# Patient Record
Sex: FEMALE | Race: WHITE | NOT HISPANIC OR LATINO | URBAN - METROPOLITAN AREA
[De-identification: names, ages, dates, MRNs, and addresses within clinical notes are randomized per-mention and may not be internally consistent; named-entity substitution may affect disease eponyms.]

---

## 2017-01-21 ENCOUNTER — EMERGENCY (EMERGENCY)
Facility: HOSPITAL | Age: 72
LOS: 1 days | Discharge: HOME | End: 2017-01-21
Attending: EMERGENCY MEDICINE | Admitting: EMERGENCY MEDICINE
Payer: MEDICARE

## 2017-01-21 VITALS
HEART RATE: 73 BPM | RESPIRATION RATE: 20 BRPM | TEMPERATURE: 99 F | OXYGEN SATURATION: 99 % | SYSTOLIC BLOOD PRESSURE: 144 MMHG | DIASTOLIC BLOOD PRESSURE: 84 MMHG

## 2017-01-21 DIAGNOSIS — S16.1XXA STRAIN OF MUSCLE, FASCIA AND TENDON AT NECK LEVEL, INITIAL ENCOUNTER: ICD-10-CM
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
## 2017-01-21 PROCEDURE — 99282 EMERGENCY DEPT VISIT SF MDM: CPT

## 2017-01-21 RX ORDER — POLYETHYLENE GLYCOL 3350 17 G/17G
0 POWDER, FOR SOLUTION ORAL
Qty: 0 | Refills: 0 | COMMUNITY

## 2017-01-21 RX ORDER — ALENDRONATE SODIUM 70 MG/1
0 TABLET ORAL
Qty: 0 | Refills: 0 | COMMUNITY

## 2017-01-21 RX ORDER — DIPHENHYDRAMINE HCL 50 MG
0 CAPSULE ORAL
Qty: 0 | Refills: 0 | COMMUNITY

## 2017-01-21 NOTE — ED PROVIDER NOTE - NS ED ATTENDING STATEMENT MOD
Attending Only I have personally performed a face to face diagnostic evaluation on this patient. I have reviewed the NP note and agree with the history, exam, and plan of care, except as noted.

## 2017-01-21 NOTE — ED PROVIDER NOTE - CHPI ED SYMPTOMS NEG
no fever/no confusion/no loss of consciousness/no blurred vision/no numbness/no nausea/no change in level of consciousness/no weakness/no vomiting/no dizziness

## 2017-01-21 NOTE — ED PROVIDER NOTE - MEDICAL DECISION MAKING DETAILS
Neck spasm, occipital, associated after beginning vigorous exercise program this week.  No neuro deficits.  No HA, no vision change, normal gait, no acute trauma.  No concern for subarachnoid hemorrhage or emergent process.  Not on AC.  Well appearing and comfortable.  Palpation reproduced mild discomfort at area in spasm, other wise normal exam.  Recommended heat packs and D/C.  Has f/u c PCP this week.  Return instructions provided.  --BMM

## 2017-01-21 NOTE — ED ADULT NURSE NOTE - OBJECTIVE STATEMENT
72 y/o F pt present to ED for intermittent head pressure to back of head, 5-10 minutes episode times this morning, denies dizziness, visual changes

## 2017-01-21 NOTE — ED ADULT TRIAGE NOTE - CHIEF COMPLAINT QUOTE
Intermittent pressure to back of head. No other symptoms. No vomiting, No visual changes. Feels the pressure in the same spot since this morning. 5-10mins betweek episodes.

## 2017-01-21 NOTE — ED PROVIDER NOTE - OBJECTIVE STATEMENT
72yo female pt, ambulatory, No PMHx, c/o occipital pressure since this am. Pt stated the pressure's lasting less than 1 minutes and intermittent pressure, about 10times today. Denies pain. Denies visual changes or N/V. Denies neck pain/ back pain. Denies sensory changes or weakness to extremities. Denies CP/SOB/ABD pain. Denies injury. Denies fever, chills or recent cold symptoms.

## 2017-01-21 NOTE — ED PROVIDER NOTE - PHYSICAL EXAMINATION
NAD, VSS, Afebrile, No facial or scalp tender, No occipital tender or lesions, No spinal tender, Neuro- intact.

## 2017-03-17 ENCOUNTER — APPOINTMENT (OUTPATIENT)
Dept: GASTROENTEROLOGY | Facility: CLINIC | Age: 72
End: 2017-03-17

## 2017-03-17 VITALS
OXYGEN SATURATION: 99 % | WEIGHT: 148 LBS | BODY MASS INDEX: 25.27 KG/M2 | HEIGHT: 64 IN | SYSTOLIC BLOOD PRESSURE: 152 MMHG | DIASTOLIC BLOOD PRESSURE: 84 MMHG | HEART RATE: 77 BPM

## 2017-03-17 DIAGNOSIS — F15.90 OTHER STIMULANT USE, UNSPECIFIED, UNCOMPLICATED: ICD-10-CM

## 2017-03-17 DIAGNOSIS — Z78.9 OTHER SPECIFIED HEALTH STATUS: ICD-10-CM

## 2017-03-17 DIAGNOSIS — Z80.3 FAMILY HISTORY OF MALIGNANT NEOPLASM OF BREAST: ICD-10-CM

## 2017-03-17 RX ORDER — LORATADINE 10 MG
17 TABLET,DISINTEGRATING ORAL
Refills: 0 | Status: ACTIVE | COMMUNITY

## 2017-04-04 ENCOUNTER — APPOINTMENT (OUTPATIENT)
Dept: GASTROENTEROLOGY | Facility: CLINIC | Age: 72
End: 2017-04-04

## 2017-04-24 ENCOUNTER — OUTPATIENT (OUTPATIENT)
Dept: OUTPATIENT SERVICES | Facility: HOSPITAL | Age: 72
LOS: 1 days | Discharge: HOME | End: 2017-04-24
Payer: MEDICARE

## 2017-04-24 ENCOUNTER — TRANSCRIPTION ENCOUNTER (OUTPATIENT)
Age: 72
End: 2017-04-24

## 2017-04-24 ENCOUNTER — APPOINTMENT (OUTPATIENT)
Dept: GASTROENTEROLOGY | Facility: HOSPITAL | Age: 72
End: 2017-04-24

## 2017-04-24 DIAGNOSIS — Z12.11 ENCOUNTER FOR SCREENING FOR MALIGNANT NEOPLASM OF COLON: ICD-10-CM

## 2017-04-24 PROCEDURE — G0121: CPT

## 2017-04-24 PROCEDURE — 45378 DIAGNOSTIC COLONOSCOPY: CPT

## 2017-04-28 DIAGNOSIS — K64.4 RESIDUAL HEMORRHOIDAL SKIN TAGS: ICD-10-CM

## 2017-04-28 DIAGNOSIS — K64.8 OTHER HEMORRHOIDS: ICD-10-CM

## 2017-04-28 DIAGNOSIS — Z88.2 ALLERGY STATUS TO SULFONAMIDES: ICD-10-CM

## 2017-04-28 DIAGNOSIS — Z12.11 ENCOUNTER FOR SCREENING FOR MALIGNANT NEOPLASM OF COLON: ICD-10-CM

## 2017-04-28 DIAGNOSIS — K57.30 DIVERTICULOSIS OF LARGE INTESTINE WITHOUT PERFORATION OR ABSCESS WITHOUT BLEEDING: ICD-10-CM

## 2017-09-18 ENCOUNTER — EMERGENCY (EMERGENCY)
Facility: HOSPITAL | Age: 72
LOS: 1 days | Discharge: HOME | End: 2017-09-18
Attending: EMERGENCY MEDICINE | Admitting: EMERGENCY MEDICINE
Payer: MEDICARE

## 2017-09-18 VITALS
HEART RATE: 78 BPM | DIASTOLIC BLOOD PRESSURE: 82 MMHG | SYSTOLIC BLOOD PRESSURE: 138 MMHG | OXYGEN SATURATION: 100 % | TEMPERATURE: 99 F | RESPIRATION RATE: 18 BRPM

## 2017-09-18 VITALS
RESPIRATION RATE: 16 BRPM | DIASTOLIC BLOOD PRESSURE: 84 MMHG | TEMPERATURE: 99 F | SYSTOLIC BLOOD PRESSURE: 156 MMHG | OXYGEN SATURATION: 100 % | HEART RATE: 95 BPM

## 2017-09-18 LAB
ALBUMIN SERPL ELPH-MCNC: 4.8 G/DL — SIGNIFICANT CHANGE UP (ref 3.3–5)
ALP SERPL-CCNC: 51 U/L — SIGNIFICANT CHANGE UP (ref 40–120)
ALT FLD-CCNC: 20 U/L RC — SIGNIFICANT CHANGE UP (ref 10–45)
ANION GAP SERPL CALC-SCNC: 16 MMOL/L — SIGNIFICANT CHANGE UP (ref 5–17)
APTT BLD: 34.7 SEC — SIGNIFICANT CHANGE UP (ref 27.5–37.4)
AST SERPL-CCNC: 19 U/L — SIGNIFICANT CHANGE UP (ref 10–40)
BASOPHILS # BLD AUTO: 0 K/UL — SIGNIFICANT CHANGE UP (ref 0–0.2)
BASOPHILS NFR BLD AUTO: 0.7 % — SIGNIFICANT CHANGE UP (ref 0–2)
BILIRUB SERPL-MCNC: 0.4 MG/DL — SIGNIFICANT CHANGE UP (ref 0.2–1.2)
BUN SERPL-MCNC: 19 MG/DL — SIGNIFICANT CHANGE UP (ref 7–23)
CALCIUM SERPL-MCNC: 10.1 MG/DL — SIGNIFICANT CHANGE UP (ref 8.4–10.5)
CHLORIDE SERPL-SCNC: 100 MMOL/L — SIGNIFICANT CHANGE UP (ref 96–108)
CO2 SERPL-SCNC: 27 MMOL/L — SIGNIFICANT CHANGE UP (ref 22–31)
CREAT SERPL-MCNC: 0.95 MG/DL — SIGNIFICANT CHANGE UP (ref 0.5–1.3)
EOSINOPHIL # BLD AUTO: 0.1 K/UL — SIGNIFICANT CHANGE UP (ref 0–0.5)
EOSINOPHIL NFR BLD AUTO: 1 % — SIGNIFICANT CHANGE UP (ref 0–6)
GLUCOSE SERPL-MCNC: 126 MG/DL — HIGH (ref 70–99)
HCT VFR BLD CALC: 46.1 % — HIGH (ref 34.5–45)
HGB BLD-MCNC: 16.1 G/DL — HIGH (ref 11.5–15.5)
INR BLD: 0.98 RATIO — SIGNIFICANT CHANGE UP (ref 0.88–1.16)
LYMPHOCYTES # BLD AUTO: 1.4 K/UL — SIGNIFICANT CHANGE UP (ref 1–3.3)
LYMPHOCYTES # BLD AUTO: 21.4 % — SIGNIFICANT CHANGE UP (ref 13–44)
MCHC RBC-ENTMCNC: 33.8 PG — SIGNIFICANT CHANGE UP (ref 27–34)
MCHC RBC-ENTMCNC: 34.9 GM/DL — SIGNIFICANT CHANGE UP (ref 32–36)
MCV RBC AUTO: 96.9 FL — SIGNIFICANT CHANGE UP (ref 80–100)
MONOCYTES # BLD AUTO: 0.5 K/UL — SIGNIFICANT CHANGE UP (ref 0–0.9)
MONOCYTES NFR BLD AUTO: 8 % — SIGNIFICANT CHANGE UP (ref 2–14)
NEUTROPHILS # BLD AUTO: 4.5 K/UL — SIGNIFICANT CHANGE UP (ref 1.8–7.4)
NEUTROPHILS NFR BLD AUTO: 68.9 % — SIGNIFICANT CHANGE UP (ref 43–77)
PLATELET # BLD AUTO: 227 K/UL — SIGNIFICANT CHANGE UP (ref 150–400)
POTASSIUM SERPL-MCNC: 3.8 MMOL/L — SIGNIFICANT CHANGE UP (ref 3.5–5.3)
POTASSIUM SERPL-SCNC: 3.8 MMOL/L — SIGNIFICANT CHANGE UP (ref 3.5–5.3)
PROT SERPL-MCNC: 7.7 G/DL — SIGNIFICANT CHANGE UP (ref 6–8.3)
PROTHROM AB SERPL-ACNC: 10.7 SEC — SIGNIFICANT CHANGE UP (ref 9.8–12.7)
RBC # BLD: 4.76 M/UL — SIGNIFICANT CHANGE UP (ref 3.8–5.2)
RBC # FLD: 11.7 % — SIGNIFICANT CHANGE UP (ref 10.3–14.5)
SODIUM SERPL-SCNC: 143 MMOL/L — SIGNIFICANT CHANGE UP (ref 135–145)
WBC # BLD: 6.5 K/UL — SIGNIFICANT CHANGE UP (ref 3.8–10.5)
WBC # FLD AUTO: 6.5 K/UL — SIGNIFICANT CHANGE UP (ref 3.8–10.5)

## 2017-09-18 PROCEDURE — 99284 EMERGENCY DEPT VISIT MOD MDM: CPT | Mod: 25

## 2017-09-18 PROCEDURE — 80053 COMPREHEN METABOLIC PANEL: CPT

## 2017-09-18 PROCEDURE — 85610 PROTHROMBIN TIME: CPT

## 2017-09-18 PROCEDURE — 85027 COMPLETE CBC AUTOMATED: CPT

## 2017-09-18 PROCEDURE — 93005 ELECTROCARDIOGRAM TRACING: CPT

## 2017-09-18 PROCEDURE — 85730 THROMBOPLASTIN TIME PARTIAL: CPT

## 2017-09-18 PROCEDURE — 70450 CT HEAD/BRAIN W/O DYE: CPT

## 2017-09-18 PROCEDURE — 70450 CT HEAD/BRAIN W/O DYE: CPT | Mod: 26

## 2017-09-18 PROCEDURE — 93010 ELECTROCARDIOGRAM REPORT: CPT

## 2017-09-18 RX ORDER — ASPIRIN/CALCIUM CARB/MAGNESIUM 324 MG
81 TABLET ORAL DAILY
Qty: 0 | Refills: 0 | Status: DISCONTINUED | OUTPATIENT
Start: 2017-09-18 | End: 2017-09-22

## 2017-09-18 RX ADMIN — Medication 81 MILLIGRAM(S): at 17:16

## 2017-09-18 NOTE — ED PROVIDER NOTE - CRANIAL NERVE AND PUPILLARY EXAM
central vision intact/cranial nerves 2-12 intact/peripheral vision intact/central and peripheral vision intact/extra-ocular movements intact

## 2017-09-18 NOTE — ED ADULT TRIAGE NOTE - CHIEF COMPLAINT QUOTE
Not thinking clearly  has had episodes in the past last attack of this was July and had it September Today was fine and was dancing today then was not seeing clearly no headache no weakness noted Had a episode of aphagia at 130 at physical therapy

## 2017-09-18 NOTE — CONSULT NOTE ADULT - PROBLEM SELECTOR RECOMMENDATION 9
-likely not episode of aphasia and possible anxiety related. Patient's visual etiology likely related to s/p cataract surgery of left eye, may need correction, needs to follow up regularly w/ outpatient surgery. However given risk factors and ABCD 2, it is appropriate to have non-emergent MRI for further evaluation.     Plan:   -secondary stroke prevention, start ASA 81mg daily, statin (pending LIPID Panel)   -stress management   -MRI brain w/o contrast (non-emergent) could be performed outpatient, patient already scheduled for outpatient neurologist appointment.   - outpatient follow up for further evaluation of left post-cataract surgery eye w/ surgeon.

## 2017-09-18 NOTE — ED PROVIDER NOTE - OBJECTIVE STATEMENT
73 yo F w/no PMH presenting for confusion. Initially seen as code stroke but was downgraded. Pt states around 1pm today she had dysphasia and blurry vision left peripheral field for about 1 hour. Resolved on own PTA. Appointment with Neurology Monday. Has happened intermittently since she was 14 years old but has occurred more often recently. B/L cataract surgery this past July. Denies fevers, CP, SOB, nausea, or any other complaints.

## 2017-09-18 NOTE — CONSULT NOTE ADULT - SUBJECTIVE AND OBJECTIVE BOX
Neurology Consult    Name: GINGER JIMENEZ    HPI:   pending ED     MEDICATIONS  (Home):     Allergies: sulfa drugs (Rash)    Objective:   Vital Signs Last 24 Hrs  T(C): 37 (18 Sep 2017 14:23), Max: 37 (18 Sep 2017 14:23)  T(F): 98.6 (18 Sep 2017 14:23), Max: 98.6 (18 Sep 2017 14:23)  HR: 82 (18 Sep 2017 15:00) (82 - 95)  BP: 137/73 (18 Sep 2017 15:00) (137/73 - 156/84)  RR: 18 (18 Sep 2017 15:00) (16 - 18)  SpO2: 99% (18 Sep 2017 15:00) (99% - 100%)    General Exam:   General appearance: No acute distress                   Neurological Exam:  Mental Status: AAOx3, fluent speech, follows commands    Cranial Nerves: EOMI, PERRL, V1-V3 intact, facial symmetry intact, no dysarthria, tongue midline, VFF    Motor: 5/5 throughout. No drift x4    Sensation: Intact to LT throughout    Coordination: FTN intact b/l    Reflexes: 1+ bilateral biceps, brachioradialis, patellar and ankle      Gait: normal and stable.      Labs:                Radiology Neurology Consult    Name: GINGER JIMENEZ    HPI: 71 yo woman who presents to [facility] w/ confusion x 2 hours. ROS also revealed dysphasia and left partial hemianopsia w/ spontaneous resolution. Patient notes visual changes are intermittent and chronic since 14 years of age and also admitted to recently having cataract surgery 7/17. Otherwise unremarkable fever, chest pain, SOB, N/V. Patient is scheduled for follow up w/ her private neurologist (patient doesn't know name) on 9/25/17. Neurology consulted for further evaluation. Initial stroke called downgraded.     PMH/PSH:   Osteoporosis   s/p cataract surgery 7/17      MEDICATIONS  (Home):    Patient Currently Takes Medications as of 21-Jan-2017 16:04 documented in Structured Notes  · 	alendronate weekly:     · 	Benadryl:  orally   · 	MiraLax:  orally     Allergies: sulfa drugs (Rash)    Objective:   Vital Signs Last 24 Hrs  T(C): 37 (18 Sep 2017 14:23), Max: 37 (18 Sep 2017 14:23)  T(F): 98.6 (18 Sep 2017 14:23), Max: 98.6 (18 Sep 2017 14:23)  HR: 82 (18 Sep 2017 15:00) (82 - 95)  BP: 137/73 (18 Sep 2017 15:00) (137/73 - 156/84)  RR: 18 (18 Sep 2017 15:00) (16 - 18)  SpO2: 99% (18 Sep 2017 15:00) (99% - 100%)    General Exam:   General appearance: No acute distress                   Neurological Exam:  Mental Status: AAOx3, fluent speech, follows commands    Cranial Nerves: EOMI, PERRL, V1-V3 intact, facial symmetry intact, no dysarthria, tongue midline, VFF    Motor: 5/5 throughout. No drift x4    Sensation: Intact to LT throughout    Coordination: FTN intact b/l    Reflexes: 1+ bilateral biceps, brachioradialis, patellar and ankle      Gait: normal and stable.      Labs:                Radiology Neurology Consult    Name: GINGER JIMENEZ    HPI: 71 yo woman who presents to [facility] w/ confusion 20 mins. ROS also revealed dysphasia and left partial hemianopsia w/ spontaneous resolution. Patient notes visual changes are intermittent and chronic since 14 years of age and also admitted to recently having cataract surgery 7/17. Otherwise unremarkable fever, chest pain, SOB, N/V. Patient is scheduled for follow up w/ her private neurologist (patient doesn't know name) on 9/25/17. Neurology consulted for further evaluation. Initial stroke called downgraded. (NIHSS 0 MRS 0 ABCD 2)     PMH/PSH:   Osteoporosis   s/p cataract surgery 7/17      MEDICATIONS  (Home):    Patient Currently Takes Medications as of 21-Jan-2017 16:04 documented in Structured Notes  · 	alendronate weekly:     · 	Benadryl:  orally   · 	MiraLax:  orally     Allergies: sulfa drugs (Rash)    Objective:   Vital Signs Last 24 Hrs  T(C): 37 (18 Sep 2017 14:23), Max: 37 (18 Sep 2017 14:23)  T(F): 98.6 (18 Sep 2017 14:23), Max: 98.6 (18 Sep 2017 14:23)  HR: 82 (18 Sep 2017 15:00) (82 - 95)  BP: 137/73 (18 Sep 2017 15:00) (137/73 - 156/84)  RR: 18 (18 Sep 2017 15:00) (16 - 18)  SpO2: 99% (18 Sep 2017 15:00) (99% - 100%)    General Exam:   General appearance: No acute distress                   Neurological Exam:  Mental Status: AAOx3, fluent speech, follows commands    Cranial Nerves: EOMI, PERRL, V1-V3 intact, facial symmetry intact, no dysarthria, tongue midline, VFF    Motor: 5/5 throughout. No drift x4    Sensation: Intact to LT throughout    Coordination: FTN intact b/l    Reflexes: Babinski absent (b/l toes downgoing)     Gait: not assessed.     Labs:                        16.1   6.5   )-----------( 227      ( 18 Sep 2017 15:21 )             46.1   09-18    143  |  100  |  19  ----------------------------<  126<H>  3.8   |  27  |  0.95    Ca    10.1      18 Sep 2017 15:21    TPro  7.7  /  Alb  4.8  /  TBili  0.4  /  DBili  x   /  AST  19  /  ALT  20  /  AlkPhos  51  09-18    Radiology

## 2017-09-18 NOTE — ED ADULT NURSE NOTE - CHIEF COMPLAINT QUOTE
Not thinking clearly  has had episodes in the past last attack of this was July and had it September Today was fine and was dancing today then was not seeing clearly no headache no weakness noted Had a episode of aphagia at 130 at physical therapy   Te episode are getting more frequent and no weakness noted no drift no facial droop at this time pt has left eye blurry

## 2017-09-18 NOTE — ED PROVIDER NOTE - MEDICAL DECISION MAKING DETAILS
72 F w/ intermittent neuro events. Not concerning for acute CVA, Likely complex migraine. Neurology consulted. CT head ordered.

## 2017-09-18 NOTE — ED PROVIDER NOTE - PROGRESS NOTE DETAILS
case discussed with neuro, ct head nl, likely anxiety c/o with visual issues related to ophth surgies has follow up. offered mri and cdu stay would rather follow up with her neuro appt on monday with oupt mri. start baby asa for stroke prevention.

## 2017-09-18 NOTE — ED ADULT NURSE NOTE - OBJECTIVE STATEMENT
Pt presents with complaint of confusion and visual disturbance, resolved.  Pt reports that in 7th and 8th grade she had multiple episodes of "splitting headaches" accompanied by aphasia and confusion, she never sought medical attention for these, they resolved spontaneously.  About 12 years ago she had an additional episode once, did not seek medical attn.  In July 2017 she had cataract sx and has since had 4 episodes, most recently today.  She describes her visual disturbances as "silver snowflakes that you cannot see through" in her L field of vision.  Since her cataract sx she feels that the glare she has been experiencing as a side effect of the procedure is exacerbating her vision issues.  This morning she felt her normal self, went to dance class, drove herself home, was making lunch when she felt confusion, aphasia and experienced the visual disturbances on the L field.  This episode lasted almost an hour and she felt it was more severe than any of her previous episodes.  She walked with her  down the block to her PT appointment (which she goes to for chronic hip/back pain), was able to walk with steady gait and no assistance, and was unable to verbally explain to them what was going on, which is what prompted them to advise her to go to ER.  Upon arrival here she is asymptomatic, stands without difficulty, gait steady, no headache, no confusion, able to relay entire history without pause, VSS.  She has never seen a neurologist but has an appointment to see one next Monday.  She has no medical problems but had a brain MRI for tinnitus which was normal.

## 2017-09-18 NOTE — CONSULT NOTE ADULT - ASSESSMENT
71 yo woman w/ a PMH of b/l cataract surgery 7/17 who presents to [facility] w/ confusion 20 mins. ROS also revealed anxiety & left monocular blurry vision, which patient states is still there when she closes her eyes. Patient notes visual changes have been ongoing since her cataracts surgery. Otherwise unremarkable fever, chest pain, SOB, N/V. Patient is scheduled for follow up w/ her private neurologist (patient doesn't know name) on 9/25/17. Neurology consulted for further evaluation. Initial stroke called downgraded. (NIHSS 0 MRS 0 ABCD 2)

## 2018-05-17 NOTE — CONSULT NOTE ADULT - PROVIDER SPECIALTY LIST ADULT
[facility]  [address] [facility], [address] [place] [address], [facility] [address]  [phone] fax      Plan of Care/ Statement of Necessity for Physical Therapy Services    Patient name: Taylor Foote Start of Care: 2018   Referral source: Breann Wasserman MD : 1958    Medical Diagnosis: Low back pain [M54.5]  Pain in right shoulder [M25.511]   Onset Date:2017 Right shoulder, and back pain longstanding P/O 2001   Treatment Diagnosis: decrease tolerance to ADLS and activities due to LBP, Right LE S/S, right shoulder pain, LOM and decrease strength right shoulder   Prior Hospitalization: see medical history Provider#: [id number]   Medications: Verified on Patient summary List    Comorbidities: neck and back surgery, depression, right frozen shoulder   Prior Level of Function: :I all areas of ADLs and activities, using Left SC, household chores , community      The [address] [place] [address] and following information is based on the information from the initial evaluation. Assessment/ key information: 62 YO female diagnosed as above and with S/S consistent with above diagnosis presents to skilled outpatient PT. CCO pain in the right shoulder, numbness on the upper shoulder, and pins feeling in the thumb and index finger of the right hand. Now she states she has frozen shoulder (per Dr. Tristen Mason) relapsed. She reports back pain longstanding, 2001 back surgery. She notes continued back issues since that time more recently with the pain in the lower back indicating coccyx region and radiating to inguinal fold and down the  Right LE anterior thigh to the knee level. Right  LE S/S that are worse with standing and walking. Pain today is 7/10. Worse lifting overhead,  Raising right arm away from body   Better sometimes medication.   Pain 7, FOTO shoulder 28, lumbar 30, - lateral shift, increased lordosis, Left SC use with antalgic/abnormal  gait pattern, Trunk ROT B 75%   SB right 56 cm left Neurology 63 cm, - slump sit, moderate TTP sacrum, denies pain at pirifomris. falls risk is low to moderate, uses Left SC, recent fall last month, decrease ability to maintain SLS. Right Shoulder IR abdomen, ER 15 pain, F 75  ABD  60    Tight end feels. MMT right shoulder NA due to pain and LOM. TTP Anterior right shoulder with trigger points right teres and sub acromial bursa region. Patient demonstrates the potential to make gains with improved ROM, strength, endurance/activity tolerance, functional FOTO survey score  and all within a reasonable time frame so as to increase their functional independence with ADLs and activities for carryover to  Improved quality of life, tolerance to household chores and community activities. Patient requires skilled Physical Therapy so as to monitor their response to and modify their treatment plan accordingly.  Patient appears to be an appropriate candidate for skilled outpatient Physical Therapy.       Evaluation Complexity History MEDIUM  Complexity : 1-2 comorbidities / personal factors will impact the outcome/ POC ; Examination MEDIUM Complexity : 3 Standardized tests and measures addressing body structure, function, activity limitation and / or participation in recreation  ;Presentation LOW Complexity : Stable, uncomplicated  ;Clinical Decision Making MEDIUM Complexity : FOTO score of 26-74  Overall Complexity Rating: LOW   Problem List: pain affecting function, decrease ROM, decrease strength, impaired gait/ balance, decrease ADL/ functional abilitiies, decrease activity tolerance, decrease flexibility/ joint mobility, decrease transfer abilities and other FOTO shoulder 28, lumbar 30   Treatment Plan may include any combination of the following: Therapeutic exercise, Therapeutic activities, Neuromuscular re-education, Physical agent/modality, Gait/balance training, Manual therapy, Patient education, Self Care training, Functional mobility training, Home safety training and Stair training  Patient / Family readiness to learn indicated by: asking questions, trying to perform skills and interest  Persons(s) to be included in education: patient (P)  Barriers to Learning/Limitations: None  Patient Goal (s):  to loosen my shoulder  Patient Self Reported Health Status: good  Rehabilitation Potential: good    Short Term Goals: To be accomplished in 5 treatments:   1 patient will have established and be I with HEP to aid with progression of skilled PT program    EVAL issued   CURRENT   2 patient will have pain 5/10 to aid with increase tolerance to ADLS and activities   EVAL 7   CURRENT     Long Term Goals: To be accomplished in 10 treatments:   1 patient will have pain 3/10 to aid with increase tolerance to ADLS and activities at home   EVAL 7   CURRENT   2 patient will have shoulder FOTO  57 to show significant improved tolerance to overhead reaching at home   EVAL 28   CURRENT   3 patient will report overall 50% improvement to aid with increase tolerance to household chores, walking and standing   EVAL decreased tolerance to standing and walking   CURRENT   4  Patient will have AROM right shoulder F 120  abd 110 to aid with increased ease with overhead reaching at home   EVAL Right shoulder F 75  ABD 60   CURRENT     Frequency / Duration: Patient to be seen 2-3 times per week for 10 treatments. Patient/ Caregiver education and instruction: Diagnosis, prognosis, self care, activity modification and exercises   [x]  Plan of care has been reviewed with SUSHIL Monson, PT 5/17/2018 2:36 PM  ________________________________________________________________________    I certify that the above Therapy Services are being furnished while the patient is under my care. I agree with the treatment plan and certify that this therapy is necessary.     [de-identified] Signature:____________________  Date:____________Time: _________    Please sign and return to [facility] [address], [address] [place] [address], [address] [place] [address] [address] [phone] fax

## 2018-07-03 ENCOUNTER — APPOINTMENT (OUTPATIENT)
Dept: COLORECTAL SURGERY | Facility: CLINIC | Age: 73
End: 2018-07-03
Payer: MEDICARE

## 2018-07-03 DIAGNOSIS — Z86.69 PERSONAL HISTORY OF OTHER DISEASES OF THE NERVOUS SYSTEM AND SENSE ORGANS: ICD-10-CM

## 2018-07-03 PROCEDURE — 46221 LIGATION OF HEMORRHOID(S): CPT

## 2018-07-03 PROCEDURE — 99203 OFFICE O/P NEW LOW 30 MIN: CPT

## 2018-07-03 RX ORDER — SODIUM SULFATE, POTASSIUM SULFATE, MAGNESIUM SULFATE 17.5; 3.13; 1.6 G/ML; G/ML; G/ML
17.5-3.13-1.6 SOLUTION, CONCENTRATE ORAL
Qty: 1 | Refills: 0 | Status: COMPLETED | COMMUNITY
Start: 2017-03-17 | End: 2017-07-03

## 2018-07-03 RX ORDER — ALENDRONATE SODIUM 70 MG/1
70 TABLET ORAL
Refills: 0 | Status: DISCONTINUED | COMMUNITY
End: 2018-07-03

## 2018-07-03 RX ORDER — DIPHENHYDRAMINE HCL 12.5MG/5ML
LIQUID (ML) ORAL
Refills: 0 | Status: COMPLETED | COMMUNITY
End: 2018-07-03

## 2018-07-25 ENCOUNTER — APPOINTMENT (OUTPATIENT)
Dept: COLORECTAL SURGERY | Facility: CLINIC | Age: 73
End: 2018-07-25
Payer: MEDICARE

## 2018-07-25 VITALS
RESPIRATION RATE: 14 BRPM | DIASTOLIC BLOOD PRESSURE: 84 MMHG | SYSTOLIC BLOOD PRESSURE: 154 MMHG | BODY MASS INDEX: 25.27 KG/M2 | HEIGHT: 64 IN | HEART RATE: 88 BPM | WEIGHT: 148 LBS

## 2018-07-25 DIAGNOSIS — K64.9 UNSPECIFIED HEMORRHOIDS: ICD-10-CM

## 2018-07-25 PROCEDURE — 99214 OFFICE O/P EST MOD 30 MIN: CPT | Mod: 25

## 2018-07-25 PROCEDURE — 46221 LIGATION OF HEMORRHOID(S): CPT

## 2018-08-21 ENCOUNTER — APPOINTMENT (OUTPATIENT)
Dept: COLORECTAL SURGERY | Facility: CLINIC | Age: 73
End: 2018-08-21
Payer: MEDICARE

## 2018-08-21 VITALS
DIASTOLIC BLOOD PRESSURE: 80 MMHG | SYSTOLIC BLOOD PRESSURE: 110 MMHG | BODY MASS INDEX: 25.27 KG/M2 | HEIGHT: 64 IN | WEIGHT: 148 LBS | RESPIRATION RATE: 14 BRPM

## 2018-08-21 DIAGNOSIS — N81.89 OTHER FEMALE GENITAL PROLAPSE: ICD-10-CM

## 2018-08-21 DIAGNOSIS — K62.3 RECTAL PROLAPSE: ICD-10-CM

## 2018-08-21 PROCEDURE — 99215 OFFICE O/P EST HI 40 MIN: CPT

## 2018-08-28 ENCOUNTER — TRANSCRIPTION ENCOUNTER (OUTPATIENT)
Age: 73
End: 2018-08-28

## 2019-01-30 ENCOUNTER — APPOINTMENT (OUTPATIENT)
Dept: UROGYNECOLOGY | Facility: CLINIC | Age: 74
End: 2019-01-30

## 2020-01-28 ENCOUNTER — APPOINTMENT (OUTPATIENT)
Dept: GASTROENTEROLOGY | Facility: CLINIC | Age: 75
End: 2020-01-28
Payer: MEDICARE

## 2020-01-28 VITALS
SYSTOLIC BLOOD PRESSURE: 134 MMHG | WEIGHT: 136 LBS | BODY MASS INDEX: 23.22 KG/M2 | OXYGEN SATURATION: 100 % | HEIGHT: 64 IN | DIASTOLIC BLOOD PRESSURE: 84 MMHG | HEART RATE: 80 BPM

## 2020-01-28 DIAGNOSIS — R19.5 OTHER FECAL ABNORMALITIES: ICD-10-CM

## 2020-01-28 DIAGNOSIS — K62.5 HEMORRHAGE OF ANUS AND RECTUM: ICD-10-CM

## 2020-01-28 PROCEDURE — 99213 OFFICE O/P EST LOW 20 MIN: CPT

## 2020-01-28 PROCEDURE — 99204 OFFICE O/P NEW MOD 45 MIN: CPT

## 2020-01-28 RX ORDER — ASPIRIN 81 MG
81 TABLET, DELAYED RELEASE (ENTERIC COATED) ORAL
Refills: 0 | Status: DISCONTINUED | COMMUNITY
End: 2020-01-28

## 2020-01-28 NOTE — PHYSICAL EXAM
[General Appearance - Alert] : alert [General Appearance - In No Acute Distress] : in no acute distress [Sclera] : the sclera and conjunctiva were normal [Outer Ear] : the ears and nose were normal in appearance [Extraocular Movements] : extraocular movements were intact [PERRL With Normal Accommodation] : pupils were equal in size, round, and reactive to light [Oropharynx] : the oropharynx was normal [Neck Appearance] : the appearance of the neck was normal [Jugular Venous Distention Increased] : there was no jugular-venous distention [Neck Cervical Mass (___cm)] : no neck mass was observed [Thyroid Diffuse Enlargement] : the thyroid was not enlarged [Thyroid Nodule] : there were no palpable thyroid nodules [Heart Rate And Rhythm] : heart rate was normal and rhythm regular [Auscultation Breath Sounds / Voice Sounds] : lungs were clear to auscultation bilaterally [Heart Sounds] : normal S1 and S2 [Heart Sounds Gallop] : no gallops [Heart Sounds Pericardial Friction Rub] : no pericardial rub [Murmurs] : no murmurs [Edema] : there was no peripheral edema [Abdomen Soft] : soft [Bowel Sounds] : normal bowel sounds [Abdomen Tenderness] : non-tender [Cervical Lymph Nodes Enlarged Posterior Bilaterally] : posterior cervical [Abdomen Mass (___ Cm)] : no abdominal mass palpated [No Spinal Tenderness] : no spinal tenderness [No CVA Tenderness] : no ~M costovertebral angle tenderness [Cervical Lymph Nodes Enlarged Anterior Bilaterally] : anterior cervical [Abnormal Walk] : normal gait [Nail Clubbing] : no clubbing  or cyanosis of the fingernails [Motor Tone] : muscle strength and tone were normal [Musculoskeletal - Swelling] : no joint swelling seen [Skin Turgor] : normal skin turgor [Skin Color & Pigmentation] : normal skin color and pigmentation [No Focal Deficits] : no focal deficits [] : no rash [Affect] : the affect was normal [Impaired Insight] : insight and judgment were intact [Oriented To Time, Place, And Person] : oriented to person, place, and time

## 2020-01-28 NOTE — ASSESSMENT
[FreeTextEntry1] : My impression is that of new onset of rectal bleeding abdominal cramping and change in bh, suggestive of viral enteritis on top of IBS.  Pt has known hemorrhoids. \par \par I have spent a great deal of time discussing the role of daily high-intensity exercise with the patient. We discussed behavior modification strategies to institute this habit.   I have discussed nutrition in great detail including consuming vegetable fibers on a daily basis and limiting simple carbohydrates 5 days per week.  We have also reviewed the benefits of soluble fiber supplementation, including (but not limited to), favorable effects on lipid profile, weight control and the salutary effects on colonic microbiota. We reviewed the effects of such daily habits on metabolism and the metabolic set point resulting in a healthy weight, decreased pain sensitivity (effecting the GI tract), bowel habits and other aspects of health.We have spent a great deal of time discussing how to address alteration of bowel habits. I have recommended use of an osmotic laxative or Pepto-Bismol nightly, on an as needed basis. This is in addition to the above recommendations.\par \par Fecal blood test in a month with col if positive.

## 2020-01-28 NOTE — HISTORY OF PRESENT ILLNESS
[FreeTextEntry1] : That she was in her usual state of the GI with bowel movements approximately every other day. Her stools have been soft on MiraLax but she has been limited in it and fiber out of concern of fecal seepage. She had acute onset of multiple stools and abdominal cramping last weekend. After multiple bowel movements she saw small amounts of rectal bleeding. She denied having fever/chills, nausea or vomiting. She is doing well from an upper GI perspective except for after eating just spoiled chicken and she feels particularly gaseous.

## 2020-03-05 LAB — HEMOCCULT STL QL IA: NEGATIVE

## 2022-04-09 ENCOUNTER — APPOINTMENT (OUTPATIENT)
Dept: ORTHOPEDIC SURGERY | Facility: CLINIC | Age: 77
End: 2022-04-09
Payer: MEDICARE

## 2022-04-09 VITALS — WEIGHT: 138 LBS | HEIGHT: 64 IN | BODY MASS INDEX: 23.56 KG/M2

## 2022-04-09 DIAGNOSIS — Z78.9 OTHER SPECIFIED HEALTH STATUS: ICD-10-CM

## 2022-04-09 DIAGNOSIS — M25.519 PAIN IN UNSPECIFIED SHOULDER: ICD-10-CM

## 2022-04-09 PROCEDURE — 73010 X-RAY EXAM OF SHOULDER BLADE: CPT | Mod: LT

## 2022-04-09 PROCEDURE — 73030 X-RAY EXAM OF SHOULDER: CPT | Mod: LT

## 2022-04-09 PROCEDURE — 99203 OFFICE O/P NEW LOW 30 MIN: CPT

## 2022-04-09 NOTE — PHYSICAL EXAM
[Sitting] : sitting [4 ___] : forward flexion 4[unfilled]/5 [4___] : abduction 4[unfilled]/5 [Left] : left shoulder [There are no fractures, subluxations or dislocations. No significant abnormalities are seen] : There are no fractures, subluxations or dislocations. No significant abnormalities are seen [] : negative Paz

## 2022-04-09 NOTE — HISTORY OF PRESENT ILLNESS
[Sudden] : sudden [6] : 6 [Dull/Aching] : dull/aching [Sharp] : sharp [de-identified] : RHD female here with L shouilder pain after fall today 4/9. Pain to scapula region. Pain with ROM. No prior shoulder issues, No N/T\par \par PMH: none [] : no [FreeTextEntry1] : left shoulder [FreeTextEntry5] : [place] today [FreeTextEntry7] : down the arm

## 2022-04-28 ENCOUNTER — APPOINTMENT (OUTPATIENT)
Dept: ORTHOPEDIC SURGERY | Facility: CLINIC | Age: 77
End: 2022-04-28

## 2022-05-11 ENCOUNTER — APPOINTMENT (OUTPATIENT)
Dept: ORTHOPEDIC SURGERY | Facility: CLINIC | Age: 77
End: 2022-05-11
Payer: MEDICARE

## 2022-05-11 PROCEDURE — 99213 OFFICE O/P EST LOW 20 MIN: CPT

## 2022-05-11 NOTE — HISTORY OF PRESENT ILLNESS
[Sudden] : sudden [6] : 6 [Dull/Aching] : dull/aching [Sharp] : sharp [] : no [FreeTextEntry1] : left shoulder [FreeTextEntry5] : [place] her shoulder today [FreeTextEntry7] : down the arm

## 2022-05-11 NOTE — REASON FOR VISIT
[FreeTextEntry2] : 5/11/22: fall on 5/9/22 onto lateral /posterior aspect of the left shoulder. Saw Triston WAGGONER on 04/09/22. Some continued discomfort. +tylenol use\par

## 2022-05-11 NOTE — PHYSICAL EXAM
[Left] : left shoulder [There are no fractures, subluxations or dislocations. No significant abnormalities are seen] : There are no fractures, subluxations or dislocations. No significant abnormalities are seen [Orientated] : orientated [Normal Skin] : normal skin [Able to Communicate] : able to communicate [Standing] : standing [Minimal] : minimal [] : discomfort with strength testing [TWNoteComboBox7] : active forward flexion 165 degrees

## 2022-05-11 NOTE — DISCUSSION/SUMMARY
[de-identified] : The documentation recorded by the scribe accurately reflects the service I personally performed and the decisions made by me.\par I, Tad Alonso, attest that this documentation has been prepared under the direction and in the presence of Provider Eran Helms MD.\par \par The patient was seen by Eran Helms MD\par

## 2022-05-11 NOTE — ASSESSMENT
[FreeTextEntry1] : xrays from 04/9/22 of the left shoulder with ac spurs, otherwise negative\par +Tylenol use at night. +ibuprofen.\par If no improvement consider further imaging.\par Bone bruise vs hairline fx. Conservative care remain the same. \par Activity modifier. \par Questions addressed. \par RTO PRN

## 2022-07-27 ENCOUNTER — APPOINTMENT (OUTPATIENT)
Dept: ORTHOPEDIC SURGERY | Facility: CLINIC | Age: 77
End: 2022-07-27

## 2022-07-27 VITALS — WEIGHT: 138 LBS | BODY MASS INDEX: 23.56 KG/M2 | HEIGHT: 64 IN

## 2022-07-27 PROCEDURE — 99213 OFFICE O/P EST LOW 20 MIN: CPT

## 2022-07-27 RX ORDER — DIAZEPAM 2 MG/1
2 TABLET ORAL
Qty: 2 | Refills: 0 | Status: ACTIVE | COMMUNITY
Start: 2022-07-27 | End: 1900-01-01

## 2022-07-27 NOTE — HISTORY OF PRESENT ILLNESS
[8] : 8 [0] : 0 [Burning] : burning [Tightness] : tightness [Intermittent] : intermittent [Sleep] : sleep [Rest] : rest [de-identified] : 7/27/22 Patient still has pain in the left shoulder; however compared to last visit pt has more movement in the shoulder. [] : no [FreeTextEntry6] : stiffness [de-identified] : movement

## 2022-07-27 NOTE — PHYSICAL EXAM
[Orientated] : orientated [Able to Communicate] : able to communicate [Normal Skin] : normal skin [Left] : left shoulder [Standing] : standing [Minimal] : minimal [] : no ecchymosis [TWNoteComboBox7] : active forward flexion 165 degrees

## 2022-07-27 NOTE — ASSESSMENT
[FreeTextEntry1] : xrays from 04/9/22 of the left shoulder with ac spurs, otherwise negative\par +Tylenol use at night. +ibuprofen.\par If no improvement consider further imaging.\par Bone bruise vs hairline fx. Conservative care remain the same. \par Activity modifier. \par Questions addressed. \par RTO PRN\par \par \par 7/27/22: continued pain. Has failed conservative measures. Recommend L shoulder to eval RTC tear.

## 2022-08-03 ENCOUNTER — APPOINTMENT (OUTPATIENT)
Dept: ORTHOPEDIC SURGERY | Facility: CLINIC | Age: 77
End: 2022-08-03

## 2022-08-03 DIAGNOSIS — S40.012A CONTUSION OF LEFT SHOULDER, INITIAL ENCOUNTER: ICD-10-CM

## 2022-08-03 DIAGNOSIS — S42.254A NONDISPLACED FRACTURE OF GREATER TUBEROSITY OF RIGHT HUMERUS, INITIAL ENCOUNTER FOR CLOSED FRACTURE: ICD-10-CM

## 2022-08-03 PROCEDURE — 99213 OFFICE O/P EST LOW 20 MIN: CPT

## 2022-08-03 NOTE — ASSESSMENT
[FreeTextEntry1] : xrays from 04/9/22 of the left shoulder with ac spurs, otherwise negative\par +Tylenol use at night. +ibuprofen.\par If no improvement consider further imaging.\par Bone bruise vs hairline fx. Conservative care remain the same. \par Activity modifier. \par Questions addressed. \par RTO PRN\par \par 7/27/22: continued pain. Has failed conservative measures. Recommend L shoulder to eval RTC tear. \par \par 8/3/22: MRI reviewed and discussed. \par due to stiffness course of PT encouraged. \par Start PT and HEP to improve mechanics and reduce pain.\par surgical intervention not indicated. \par Questions addressed. \par RTO PRN. \par

## 2022-08-03 NOTE — DISCUSSION/SUMMARY
[de-identified] : The documentation recorded by the scribe accurately reflects the service I personally performed and the decisions made by me.\par I, Tad Alonso, attest that this documentation has been prepared under the direction and in the presence of Provider Eran Helms MD.\par \par The patient was seen by Eran Helms MD\par

## 2022-08-03 NOTE — HISTORY OF PRESENT ILLNESS
[8] : 8 [0] : 0 [Burning] : burning [Tightness] : tightness [Intermittent] : intermittent [Sleep] : sleep [Rest] : rest [de-identified] : 7/27/22: Patient still has pain in the left shoulder; however compared to last visit pt has more movement in the shoulder. [] : no [FreeTextEntry6] : stiffness [de-identified] : movement  [de-identified] : rest

## 2022-09-01 ENCOUNTER — APPOINTMENT (OUTPATIENT)
Dept: ORTHOPEDIC SURGERY | Facility: CLINIC | Age: 77
End: 2022-09-01

## 2022-09-01 VITALS — WEIGHT: 138 LBS | HEIGHT: 64 IN | BODY MASS INDEX: 23.56 KG/M2

## 2022-09-01 DIAGNOSIS — M17.11 UNILATERAL PRIMARY OSTEOARTHRITIS, RIGHT KNEE: ICD-10-CM

## 2022-09-01 DIAGNOSIS — M11.261 OTHER CHONDROCALCINOSIS, RIGHT KNEE: ICD-10-CM

## 2022-09-01 PROCEDURE — 99213 OFFICE O/P EST LOW 20 MIN: CPT | Mod: 25

## 2022-09-01 PROCEDURE — J3490M: CUSTOM

## 2022-09-01 PROCEDURE — 73564 X-RAY EXAM KNEE 4 OR MORE: CPT | Mod: RT

## 2022-09-01 PROCEDURE — 20610 DRAIN/INJ JOINT/BURSA W/O US: CPT | Mod: RT

## 2022-09-01 NOTE — ASSESSMENT
[FreeTextEntry1] : TRAUMATIC RIGHT KNEE PAIN AFTER DANCING 1 MONTH AGO, NO MECHANICAL SYMPTOMS\par XRAYS WITH MILD MFC OA, CHONDROCALCINOSIS\par CSI DISCUSSED AND DONE TODAY, TOLERATED WELL\par DISCUSSED MRI IF SYMPTOMS PERSIST\par KNEE SLEEVE PRN

## 2022-09-01 NOTE — PROCEDURE
[FreeTextEntry3] : Large Joint Injection was performed because of pain and inflammation. Anesthesia: ethyl chloride sprayed topically.. \par Celestone: 2 cc. \par Lidocaine: 3 cc. \par Marcaine: 3 cc. \par \par Medication was injected in the RIGHT KNEE. Patient has tried OTC's including aspirin, Ibuprofen, Aleve etc or prescription NSAIDS, and/or exercises at home and/ or physical therapy without satisfactory response and Patient has decreased mobility in the joint. The risks, benefits, and alternatives to cortisone injection were explained in full to the patient. Risks outlined include but are not limited to infection, sepsis, bleeding, scarring, skin discoloration, temporary increase in pain, syncopal episode, failure to resolve symptoms, allergic reaction, symptom recurrence, and elevation of blood sugar in diabetics. Patient understood the risks. All questions were answered. After discussion of options, patient requested an injection. Oral informed consent was obtained and sterile prep was done of the injection site with betadyne and alcohol. Sterile technique was utilized for the procedure including the preparation of the solutions used for the injection. Patient tolerated the procedure well. Advised to ice the injection site this evening. Post Procedure Instructions: Patient was advised to call if redness, pain, or fever occur and apply ice for 15 min. out of every hour for the next 12-24 hours as tolerated. patient was advised to rest the joint(s) for 2 days. \par

## 2022-09-01 NOTE — IMAGING
Addended by: JANAY CORREA on: 2/18/2022 12:24 PM     Modules accepted: Orders     [de-identified] : RIGHT KNEE\par 1. No effusion, no warmth, no ecchymosis, no erythema.\par 2. MEDIAL JOINT TTP\par 3. Range of motion 0-140 without pain\par 4. 5/5 quadriceps and hamstring strength\par 5. Negative Lachman, negative Norman, negative anterior drawer, negative posterior drawer, negative patella apprehension; no extensor lag: no varus or valgus instability.\par 6. Motor and sensory intact distally\par 7. Negative Dago\par 8. Non-antalgic gait\par  [Right] : right knee [All Views] : anteroposterior, lateral, skyline, and anteroposterior standing [There are no fractures, subluxations or dislocations. No significant abnormalities are seen] : There are no fractures, subluxations or dislocations. No significant abnormalities are seen [Mild tricompartmental OA medial narrowing] : Mild tricompartmental OA medial narrowing [FreeTextEntry9] : CHONDROCALCINOSIS

## 2022-09-01 NOTE — HISTORY OF PRESENT ILLNESS
[Gradual] : gradual [8] : 8 [0] : 0 [Dull/Aching] : dull/aching [Sharp] : sharp [Nothing helps with pain getting better] : Nothing helps with pain getting better [de-identified] : 09/01/2022:  Ms. JIMENEZ IS A 77 year YEAR OLD female PRESENTS TODAY FOR RIGHT KNEE. PAIN FOR THE PAST MONTH AFTER TWISTING KNEE WHILE DANCING. PAIN SLOWLY IMPROVING. NO MECHANICAL SYMPTOMS. WORSE WITH DEEP FLEXION. [] : no [FreeTextEntry1] : right knee  [FreeTextEntry4] : 4 weeks ago [FreeTextEntry5] : pt states she twisted it doing a dance step and it has been bothering her for a while  [de-identified] : movement

## 2023-05-09 NOTE — CONSULT NOTE ADULT - PROBLEM SELECTOR PROBLEM 1
Refill requested. Chart reviewed. Protocol met. Refilled as requested.     Current Outpatient Medications   Medication Sig Dispense Refill   • esomeprazole (NexIUM) 40 MG capsule TAKE 1 CAPSULE DAILY BEFORE BREAKFAST 90 capsule 3   • levothyroxine 112 MCG tablet TAKE 1 TABLET DAILY 90 tablet 1   • azithromycin (ZITHROMAX) 250 MG tablet Take 1 tablet by mouth daily. Take 2 tab day 1, then 1 tab po daily 6 tablet 0   • losartan-hydrochlorothiazide (HYZAAR) 100-25 MG per tablet Take 1 tablet by mouth daily. Appointment required for further refills 90 tablet 0   • Glucosamine-Chondroitin (GLUCOSAMINE CHONDR COMPLEX PO) Take 1 tablet by mouth daily.     • CALCIUM PO Take 1 tablet by mouth daily.     • TURMERIC PO Take 1 tablet by mouth daily.     • Cholecalciferol (Vitamin D) 125 MCG (5000 UT) Cap Take 5,000 Units by mouth daily.     • Omega-3 Fatty Acids (FISH OIL PO) Take 11 capsules by mouth daily.     • POTASSIUM CHLORIDE PO Take 1 tablet by mouth daily.     • MAGNESIUM PO Take 1 tablet by mouth daily.     • aspirin 81 MG tablet Take 81 mg by mouth daily.       No current facility-administered medications for this visit.        R/O Aphasia

## 2023-09-13 ENCOUNTER — APPOINTMENT (OUTPATIENT)
Dept: ORTHOPEDIC SURGERY | Facility: CLINIC | Age: 78
End: 2023-09-13
Payer: MEDICARE

## 2023-09-13 VITALS — BODY MASS INDEX: 23.9 KG/M2 | WEIGHT: 140 LBS | HEIGHT: 64 IN

## 2023-09-13 DIAGNOSIS — Z00.00 ENCOUNTER FOR GENERAL ADULT MEDICAL EXAMINATION W/OUT ABNORMAL FINDINGS: ICD-10-CM

## 2023-09-13 DIAGNOSIS — M25.552 PAIN IN LEFT HIP: ICD-10-CM

## 2023-09-13 PROCEDURE — 99213 OFFICE O/P EST LOW 20 MIN: CPT

## 2023-09-13 PROCEDURE — 72100 X-RAY EXAM L-S SPINE 2/3 VWS: CPT

## 2023-10-25 ENCOUNTER — APPOINTMENT (OUTPATIENT)
Dept: ORTHOPEDIC SURGERY | Facility: CLINIC | Age: 78
End: 2023-10-25
Payer: MEDICARE

## 2023-10-25 VITALS — WEIGHT: 140 LBS | HEIGHT: 64 IN | BODY MASS INDEX: 23.9 KG/M2

## 2023-10-25 DIAGNOSIS — M51.36 OTHER INTERVERTEBRAL DISC DEGENERATION, LUMBAR REGION: ICD-10-CM

## 2023-10-25 DIAGNOSIS — M70.62 TROCHANTERIC BURSITIS, LEFT HIP: ICD-10-CM

## 2023-10-25 PROCEDURE — 99214 OFFICE O/P EST MOD 30 MIN: CPT

## 2024-08-22 ENCOUNTER — TRANSCRIPTION ENCOUNTER (OUTPATIENT)
Age: 79
End: 2024-08-22

## 2024-08-22 ENCOUNTER — APPOINTMENT (OUTPATIENT)
Dept: ORTHOPEDIC SURGERY | Facility: CLINIC | Age: 79
End: 2024-08-22
Payer: MEDICARE

## 2024-08-22 VITALS — BODY MASS INDEX: 23.9 KG/M2 | WEIGHT: 140 LBS | HEIGHT: 64 IN

## 2024-08-22 DIAGNOSIS — M65.311 TRIGGER THUMB, RIGHT THUMB: ICD-10-CM

## 2024-08-22 PROCEDURE — J3490M: CUSTOM | Mod: NC,JZ

## 2024-08-22 PROCEDURE — 20550 NJX 1 TENDON SHEATH/LIGAMENT: CPT | Mod: RT

## 2024-08-22 PROCEDURE — 73130 X-RAY EXAM OF HAND: CPT | Mod: RT

## 2024-08-22 PROCEDURE — 99213 OFFICE O/P EST LOW 20 MIN: CPT | Mod: 25

## 2024-08-22 NOTE — ASSESSMENT
[FreeTextEntry1] : Right thumb tendon sheath injection was performed because of pain and inflammation Anesthesia: ethyl chloride sprayed topically Celestone 6mg: An injection of Celestone 1cc Lidocaine: An injection of Lidocaine 1% 1cc Marcaine: An injection of Marcaine 0.5% 1cc   The risks, benefits, and alternatives to cortisone injection were explained in full to the patient. Risks outlined include but are not limited to infection, sepsis, bleeding, scarring, skin discoloration, temporary increase in pain, syncopal episode, failure to resolve symptoms, allergic reaction, symptom recurrence, and elevation of blood sugar in diabetics. Patient understood the risks. All questions were answered. After discussion of options, patient verbally consented to an injection. Sterile prep was done of the injection site. Patient tolerated the procedure well. Advised to ice the injection site this evening.   Light activities- advance as tolerated Ice as needed Return prn

## 2024-08-22 NOTE — PHYSICAL EXAM
[de-identified] : R hand:  Mild swelling  Tender 1st A1 pulley  Decreased thumb ROM  +thumb triggering  Xrays min OA

## 2024-08-22 NOTE — HISTORY OF PRESENT ILLNESS
[5] : 5 [6] : 6 [Dull/Aching] : dull/aching [de-identified] : She leaned on R thumb a few weeks ago and now has pain Feels a click, locks  [] : no [FreeTextEntry1] : R thumb [FreeTextEntry5] : she fell few weeks ago using brace

## 2025-01-09 ENCOUNTER — APPOINTMENT (OUTPATIENT)
Dept: ORTHOPEDIC SURGERY | Facility: CLINIC | Age: 80
End: 2025-01-09

## 2025-01-24 ENCOUNTER — APPOINTMENT (OUTPATIENT)
Dept: NEUROSURGERY | Facility: CLINIC | Age: 80
End: 2025-01-24

## 2025-06-09 ENCOUNTER — APPOINTMENT (OUTPATIENT)
Dept: ORTHOPEDIC SURGERY | Facility: CLINIC | Age: 80
End: 2025-06-09
Payer: MEDICARE

## 2025-06-09 VITALS — BODY MASS INDEX: 23.9 KG/M2 | HEIGHT: 64 IN | WEIGHT: 140 LBS

## 2025-06-09 PROCEDURE — 99213 OFFICE O/P EST LOW 20 MIN: CPT

## 2025-06-09 RX ORDER — METHYLPREDNISOLONE 4 MG/1
4 TABLET ORAL
Qty: 1 | Refills: 0 | Status: ACTIVE | COMMUNITY
Start: 2025-06-09 | End: 1900-01-01